# Patient Record
Sex: FEMALE | Race: WHITE | NOT HISPANIC OR LATINO | Employment: OTHER | URBAN - METROPOLITAN AREA
[De-identification: names, ages, dates, MRNs, and addresses within clinical notes are randomized per-mention and may not be internally consistent; named-entity substitution may affect disease eponyms.]

---

## 2017-07-19 ENCOUNTER — OFFICE VISIT (OUTPATIENT)
Dept: URGENT CARE | Facility: CLINIC | Age: 82
End: 2017-07-19
Payer: MEDICARE

## 2017-07-19 VITALS
BODY MASS INDEX: 19.83 KG/M2 | OXYGEN SATURATION: 96 % | TEMPERATURE: 98 F | RESPIRATION RATE: 17 BRPM | SYSTOLIC BLOOD PRESSURE: 153 MMHG | HEIGHT: 61 IN | DIASTOLIC BLOOD PRESSURE: 75 MMHG | WEIGHT: 105 LBS | HEART RATE: 95 BPM

## 2017-07-19 DIAGNOSIS — R05.9 COUGH IN ADULT PATIENT: Primary | ICD-10-CM

## 2017-07-19 PROBLEM — G25.81 RESTLESS LEG SYNDROME: Status: ACTIVE | Noted: 2017-07-19

## 2017-07-19 PROBLEM — J18.9 PNEUMONIA OF LEFT UPPER LOBE DUE TO INFECTIOUS ORGANISM: Status: ACTIVE | Noted: 2017-07-19

## 2017-07-19 PROBLEM — J18.9 LEFT LOWER LOBE PNEUMONIA: Status: ACTIVE | Noted: 2017-07-19

## 2017-07-19 PROCEDURE — 1159F MED LIST DOCD IN RCRD: CPT | Mod: S$GLB,,, | Performed by: EMERGENCY MEDICINE

## 2017-07-19 PROCEDURE — 99213 OFFICE O/P EST LOW 20 MIN: CPT | Mod: S$GLB,,, | Performed by: EMERGENCY MEDICINE

## 2017-07-19 RX ORDER — GABAPENTIN 300 MG/1
300 CAPSULE ORAL
COMMUNITY

## 2017-07-19 RX ORDER — TRIPROLIDINE/PSEUDOEPHEDRINE 2.5MG-60MG
TABLET ORAL EVERY 6 HOURS PRN
COMMUNITY
End: 2024-01-24

## 2017-07-19 NOTE — PROGRESS NOTES
Subjective:       Patient ID: Mendy Perdomo is a 86 y.o. female.    Chief Complaint: Cough (2 weeks)    Cough   This is a new problem. The current episode started 1 to 4 weeks ago. The problem has been gradually worsening. The problem occurs constantly. The cough is productive of sputum. Associated symptoms include chills, ear congestion, ear pain, a fever, nasal congestion and postnasal drip. Pertinent negatives include no chest pain, eye redness, headaches, myalgias, sore throat, shortness of breath or wheezing. The symptoms are aggravated by lying down. She has tried oral steroids for the symptoms. The treatment provided no relief. Her past medical history is significant for pneumonia.     Review of Systems   Constitution: Positive for chills and fever. Negative for malaise/fatigue.   HENT: Positive for congestion, ear pain and postnasal drip. Negative for headaches, hoarse voice and sore throat.    Eyes: Negative for discharge and redness.   Cardiovascular: Negative for chest pain, dyspnea on exertion and leg swelling.   Respiratory: Positive for cough. Negative for shortness of breath, sputum production and wheezing.    Musculoskeletal: Negative for myalgias.   Gastrointestinal: Negative for abdominal pain and nausea.       Objective:      Physical Exam   Constitutional: She is oriented to person, place, and time. She appears well-developed and well-nourished. She is cooperative.  Non-toxic appearance. She does not appear ill. No distress.   HENT:   Head: Normocephalic and atraumatic.   Right Ear: Hearing, tympanic membrane, external ear and ear canal normal.   Left Ear: Hearing, tympanic membrane, external ear and ear canal normal.   Nose: Nose normal. No mucosal edema, rhinorrhea or nasal deformity. No epistaxis. Right sinus exhibits no maxillary sinus tenderness and no frontal sinus tenderness. Left sinus exhibits no maxillary sinus tenderness and no frontal sinus tenderness.   Mouth/Throat: Uvula is  midline, oropharynx is clear and moist and mucous membranes are normal. No trismus in the jaw. Normal dentition. No uvula swelling. No posterior oropharyngeal erythema.   Eyes: Conjunctivae and lids are normal. No scleral icterus.   Sclera clear bilat   Neck: Trachea normal, full passive range of motion without pain and phonation normal. Neck supple.   Cardiovascular: Normal rate, regular rhythm, normal heart sounds, intact distal pulses and normal pulses.    Pulmonary/Chest: Effort normal and breath sounds normal. No respiratory distress.   Persistent cough. Describes greenish sputum. Mild coarse breath sounds on the left. No musical wheezes.   Abdominal: Soft. Normal appearance. She exhibits no distension. There is no tenderness.   Musculoskeletal: Normal range of motion. She exhibits no edema or deformity.   Neurological: She is alert and oriented to person, place, and time. She exhibits normal muscle tone. Coordination normal.   Skin: Skin is warm and intact. She is diaphoretic (Mild clammy feeling). No pallor.   Patient feels mildly clammy.   Psychiatric: She has a normal mood and affect. Her speech is normal and behavior is normal. Judgment and thought content normal. Cognition and memory are normal.   Nursing note and vitals reviewed.      Assessment:       1. Cough in adult patient        Plan:         Cough in adult patient  -     X-Ray Chest PA And Lateral; Future; Expected date: 07/19/2017      Chest X-ray reviewed. Old scarring on the left. Unable to rule out new infiltrate. Referring to the ER.

## 2017-07-20 PROBLEM — G47.00 INSOMNIA: Status: ACTIVE | Noted: 2017-07-20

## 2017-12-06 ENCOUNTER — OFFICE VISIT (OUTPATIENT)
Dept: URGENT CARE | Facility: CLINIC | Age: 82
End: 2017-12-06
Payer: MEDICARE

## 2017-12-06 VITALS
OXYGEN SATURATION: 96 % | HEART RATE: 57 BPM | DIASTOLIC BLOOD PRESSURE: 72 MMHG | TEMPERATURE: 96 F | SYSTOLIC BLOOD PRESSURE: 159 MMHG | RESPIRATION RATE: 16 BRPM

## 2017-12-06 DIAGNOSIS — L03.011 PARONYCHIA OF FINGER OF RIGHT HAND: Primary | ICD-10-CM

## 2017-12-06 PROCEDURE — 99213 OFFICE O/P EST LOW 20 MIN: CPT | Mod: S$GLB,,, | Performed by: FAMILY MEDICINE

## 2017-12-06 RX ORDER — SULFAMETHOXAZOLE AND TRIMETHOPRIM 800; 160 MG/1; MG/1
1 TABLET ORAL 2 TIMES DAILY
Qty: 14 TABLET | Refills: 0 | Status: SHIPPED | OUTPATIENT
Start: 2017-12-06 | End: 2017-12-13

## 2017-12-06 RX ORDER — FLUCONAZOLE 150 MG/1
150 TABLET ORAL DAILY
Qty: 1 TABLET | Refills: 0 | Status: SHIPPED | OUTPATIENT
Start: 2017-12-06 | End: 2017-12-07

## 2017-12-06 NOTE — PATIENT INSTRUCTIONS
Please return here or go to the Emergency Department for any concerns or worsening of condition.  If you were prescribed antibiotics, please take them to completion.  If you were prescribed a narcotic medication, do not drive or operate heavy equipment or machinery while taking these medications.  Please follow up with your primary care doctor or specialist as needed.  If you  smoke, please stop smoking.  Abscess/cellulitis    If you have been diagnosed with an abscess, this is an infection which causes a collection of pus under the skin. Symptoms include swelling, redness and pain.   If your abscess does not need to be opened and drained, in the next few days, your absess MAY improve with the antibiotics and warm compresses, OR it may collect in a manner which will necessitate opening and draining it.   If it was drained, you should return in 2-3 days to have the packing removed.     If you were diagnosed with cellulitis, this is an infection in the layers of the skin. It is usually the result of an infected bite or injury to the top surface of the skin or from poor venous circulation.     You should take your antibiotics as directed - until gone.  You should follow-up with your Primary Care Doctor in 2-3 days for a re-check.  Watch for worseniong symptoms such as increased swelling, worsening of infection, increased pain, red streaks, and fever.   Go to the ER if you suddenly worsen with severe symptoms    Please return here or go to the Emergency Department for any concerns or worsening of condition.  If you were prescribed antibiotics, please take them to completion.  If you were prescribed a narcotic medication, do not drive or operate heavy equipment or machinery while taking these medications.  Please follow up with your primary care doctor or specialist as needed.  If you  smoke, please stop smoking.

## 2017-12-06 NOTE — PROGRESS NOTES
Subjective:       Patient ID: Mendy Perdomo is a 86 y.o. female.    Vitals:  tympanic temperature is 95.6 °F (35.3 °C) (abnormal). Her blood pressure is 159/72 (abnormal) and her pulse is 57 (abnormal). Her respiration is 16 and oxygen saturation is 96%.     Chief Complaint: Nail Problem    PT C/O POSSIBLE PARONYCHIA ON RIGHT RING FINGER, STARTED ABOUT 6 WEEKS , REDNESS NOTED, TENDER TO TOUCH, POURING PEROXIDE WITH NO RELIEF,       Nail Problem   This is a new problem. The current episode started more than 1 month ago. The problem occurs constantly. The problem has been unchanged. Pertinent negatives include no abdominal pain, chest pain, chills, fever, headaches, nausea, rash, sore throat or vomiting. Treatments tried: PEROXIDE  The treatment provided no relief.     Review of Systems   Constitution: Negative for chills and fever.   HENT: Negative for sore throat.    Eyes: Negative for blurred vision.   Cardiovascular: Negative for chest pain.   Respiratory: Negative for shortness of breath.    Skin: Negative for rash.   Musculoskeletal: Negative for back pain and joint pain.   Gastrointestinal: Negative for abdominal pain, diarrhea, nausea and vomiting.   Neurological: Negative for headaches.   Psychiatric/Behavioral: The patient is not nervous/anxious.        Objective:      Physical Exam   Constitutional: She appears well-developed and well-nourished.   HENT:   Head: Normocephalic and atraumatic.   Right Ear: External ear normal.   Left Ear: External ear normal.   Nose: Nose normal.   Mouth/Throat: Oropharynx is clear and moist. No oropharyngeal exudate.   Eyes: Conjunctivae are normal. Right eye exhibits no discharge. Left eye exhibits no discharge.   Cardiovascular: Normal rate, regular rhythm and normal heart sounds.    Pulmonary/Chest: Effort normal and breath sounds normal. She has no wheezes.   Skin: Skin is warm and dry.   Right 3rd finger with erythema to nail border, no drainage   Psychiatric: She  has a normal mood and affect. Her behavior is normal.   Vitals reviewed.      Assessment:       1. Paronychia of finger of right hand        Plan:         Paronychia of finger of right hand  -     fluconazole (DIFLUCAN) 150 MG Tab; Take 1 tablet (150 mg total) by mouth once daily.  Dispense: 1 tablet; Refill: 0    Other orders  -     sulfamethoxazole-trimethoprim 800-160mg (BACTRIM DS) 800-160 mg Tab; Take 1 tablet by mouth 2 (two) times daily.  Dispense: 14 tablet; Refill: 0

## 2017-12-15 ENCOUNTER — OFFICE VISIT (OUTPATIENT)
Dept: URGENT CARE | Facility: CLINIC | Age: 82
End: 2017-12-15
Payer: MEDICARE

## 2017-12-15 VITALS
DIASTOLIC BLOOD PRESSURE: 62 MMHG | OXYGEN SATURATION: 98 % | SYSTOLIC BLOOD PRESSURE: 128 MMHG | BODY MASS INDEX: 21.2 KG/M2 | TEMPERATURE: 97 F | WEIGHT: 108 LBS | HEIGHT: 60 IN | HEART RATE: 59 BPM

## 2017-12-15 DIAGNOSIS — L03.011 PARONYCHIA OF RIGHT RING FINGER: Primary | ICD-10-CM

## 2017-12-15 PROCEDURE — 99213 OFFICE O/P EST LOW 20 MIN: CPT | Mod: S$GLB,,, | Performed by: EMERGENCY MEDICINE

## 2017-12-15 RX ORDER — KETOCONAZOLE 20 MG/G
CREAM TOPICAL
Qty: 15 G | Refills: 1 | Status: SHIPPED | OUTPATIENT
Start: 2017-12-15 | End: 2018-12-15

## 2017-12-15 NOTE — PROGRESS NOTES
Subjective:       Patient ID: Mendy Perdomo is a 86 y.o. female.    Vitals:  height is 5' (1.524 m) and weight is 49 kg (108 lb). Her oral temperature is 97 °F (36.1 °C). Her blood pressure is 128/62 and her pulse is 59 (abnormal). Her oxygen saturation is 98%.     Chief Complaint: Hand Pain (Rt. hand 4th digit)    Finished last antibiotic this past Wed.      Hand Pain    The incident occurred more than 1 week ago. There was no injury mechanism. The pain radiates to the right hand. The pain is mild. The pain has been fluctuating since the incident.     Review of Systems   Skin: Positive for color change, nail changes and poor wound healing. Negative for rash.       Objective:      Physical Exam   Constitutional: She is oriented to person, place, and time. She appears well-developed and well-nourished. She is cooperative.  Non-toxic appearance. She does not appear ill. No distress.   HENT:   Head: Normocephalic and atraumatic.   Right Ear: Hearing, tympanic membrane, external ear and ear canal normal.   Left Ear: Hearing, tympanic membrane, external ear and ear canal normal.   Nose: Nose normal. No mucosal edema, rhinorrhea or nasal deformity. No epistaxis. Right sinus exhibits no maxillary sinus tenderness and no frontal sinus tenderness. Left sinus exhibits no maxillary sinus tenderness and no frontal sinus tenderness.   Mouth/Throat: Uvula is midline, oropharynx is clear and moist and mucous membranes are normal. No trismus in the jaw. Normal dentition. No uvula swelling. No posterior oropharyngeal erythema.   Eyes: Conjunctivae and lids are normal. Right eye exhibits no discharge. Left eye exhibits no discharge. No scleral icterus.   Sclera clear bilat   Neck: Trachea normal, normal range of motion, full passive range of motion without pain and phonation normal. Neck supple.   Cardiovascular: Normal rate, regular rhythm, normal heart sounds, intact distal pulses and normal pulses.    Pulmonary/Chest: Effort  normal and breath sounds normal. No respiratory distress.   Abdominal: Soft. Normal appearance. She exhibits no distension, no pulsatile midline mass and no mass. There is no tenderness.   Musculoskeletal: Normal range of motion. She exhibits no edema or deformity.        Arms:  Neurological: She is alert and oriented to person, place, and time. She exhibits normal muscle tone. Coordination normal.   Skin: Skin is warm, dry and intact. She is not diaphoretic. No pallor.   Psychiatric: She has a normal mood and affect. Her speech is normal and behavior is normal. Judgment and thought content normal. Cognition and memory are normal.   Nursing note and vitals reviewed.      Assessment:       1. Paronychia of right ring finger        Plan:         Paronychia of right ring finger  -     ketoconazole (NIZORAL) 2 % cream; Apply to affected area twice daily.  Dispense: 15 g; Refill: 1

## 2017-12-15 NOTE — PATIENT INSTRUCTIONS
Paronychia of the Finger or Toe  Paronychia is an infection near a fingernail or toenail. It usually occurs when an opening in the cuticle or an ingrown toenail lets bacteria under the skin.  The infection will need to be drained if pus is present. If the infection has been caught early, you may need only antibiotic treatment. Healing will take about 1 to 2 weeks.  Home care  Follow these guidelines when caring for yourself at home:  · Clean and soak the toe or finger. Do this 2 times a day for the first 3 days. To do so:  ¨ Soak your foot or hand in a tub of warm water for 5 minutes. Or hold your toe or finger under a faucet of warm running water for 5 minutes.  ¨ Clean any crust away with soap and water using a cotton swab.  ¨ Put antibiotic ointment on the infected area.  · Change the dressing daily or any time it gets dirty.  · If you were given antibiotics, take them as directed until they are all gone.  · If your infection is on a toe, wear comfortable shoes with a lot of toe room. You can also wear open-toed sandals while your toe heals.  · You may use over-the-counter medicine (acetaminophen or ibuprofen to help with pain, unless another medicine was prescribed. If you have chronic liver or kidney disease, talk with your healthcare provider before using these medicines. Also talk with your provider if you've had a stomach ulcer or GI (gastrointestinal) bleeding.  Prevention  The following can prevent paronychia:  · Avoid cutting or playing with your cuticles at home.  · Don't bite your nails.  · Don't suck on your thumbs or fingers.  Follow-up care  Follow up with your healthcare provider, or as advised.  When to seek medical advice  Call your healthcare provider right away if any of these occur:  · Redness, pain, or swelling of the finger or toe gets worse  · Red streaks in the skin leading away from the wound  · Pus or fluid draining from the nail area  · Fever of 100.4ºF (38ºC) or higher, or as directed  by your provider  Date Last Reviewed: 8/1/2016  © 0068-8701 [employer]. [address] [place] [address], [place] [address]. All rights reserved. This information is not intended as a substitute for professional medical care. Always follow your healthcare professional's instructions.

## 2017-12-19 ENCOUNTER — TELEPHONE (OUTPATIENT)
Dept: URGENT CARE | Facility: CLINIC | Age: 82
End: 2017-12-19

## 2018-12-05 ENCOUNTER — OFFICE VISIT (OUTPATIENT)
Dept: URGENT CARE | Facility: CLINIC | Age: 83
End: 2018-12-05
Payer: MEDICARE

## 2018-12-05 VITALS
WEIGHT: 108 LBS | BODY MASS INDEX: 21.2 KG/M2 | HEART RATE: 65 BPM | OXYGEN SATURATION: 98 % | HEIGHT: 60 IN | SYSTOLIC BLOOD PRESSURE: 158 MMHG | DIASTOLIC BLOOD PRESSURE: 55 MMHG | TEMPERATURE: 97 F

## 2018-12-05 DIAGNOSIS — J06.9 VIRAL URI WITH COUGH: ICD-10-CM

## 2018-12-05 DIAGNOSIS — R05.9 COUGH: Primary | ICD-10-CM

## 2018-12-05 PROCEDURE — 99214 OFFICE O/P EST MOD 30 MIN: CPT | Mod: S$GLB,,, | Performed by: PHYSICIAN ASSISTANT

## 2018-12-05 PROCEDURE — 71046 X-RAY EXAM CHEST 2 VIEWS: CPT | Mod: S$GLB,,, | Performed by: RADIOLOGY

## 2018-12-05 RX ORDER — FLUTICASONE PROPIONATE 50 MCG
2 SPRAY, SUSPENSION (ML) NASAL DAILY
Qty: 1 BOTTLE | Refills: 0 | Status: SHIPPED | OUTPATIENT
Start: 2018-12-05

## 2018-12-05 RX ORDER — BENZONATATE 100 MG/1
100 CAPSULE ORAL EVERY 6 HOURS PRN
Qty: 60 CAPSULE | Refills: 1 | Status: SHIPPED | OUTPATIENT
Start: 2018-12-05 | End: 2019-12-05

## 2018-12-05 NOTE — PROGRESS NOTES
Subjective:       Patient ID: Mendy Perdomo is a 87 y.o. female.    Vitals:  height is 5' (1.524 m) and weight is 49 kg (108 lb). Her tympanic temperature is 97.1 °F (36.2 °C). Her blood pressure is 158/55 (abnormal) and her pulse is 65. Her oxygen saturation is 98%.     Chief Complaint: URI    x1 week, last Advil dose today @12:00      URI    This is a new problem. The current episode started 1 to 4 weeks ago. The problem has been gradually worsening. There has been no fever. Associated symptoms include congestion, coughing and sneezing. Pertinent negatives include no ear pain, nausea, rash, sinus pain, sore throat, vomiting or wheezing. She has tried acetaminophen for the symptoms. The treatment provided mild relief.       Constitution: Positive for fatigue. Negative for chills, sweating and fever.   HENT: Positive for congestion and sinus pressure. Negative for ear pain, sinus pain, sore throat and voice change.    Neck: Negative for painful lymph nodes.   Eyes: Negative for eye redness.   Respiratory: Positive for cough. Negative for chest tightness, sputum production, bloody sputum, COPD, shortness of breath, stridor, wheezing and asthma.    Gastrointestinal: Negative for nausea and vomiting.   Musculoskeletal: Negative for muscle ache.   Skin: Negative for rash.   Allergic/Immunologic: Positive for sneezing. Negative for seasonal allergies and asthma.   Hematologic/Lymphatic: Negative for swollen lymph nodes.       Objective:      Physical Exam   Constitutional: She is oriented to person, place, and time. She appears well-developed and well-nourished. She is cooperative.  Non-toxic appearance. She does not appear ill. No distress.   HENT:   Head: Normocephalic and atraumatic.   Right Ear: Hearing, tympanic membrane, external ear and ear canal normal.   Left Ear: Hearing, tympanic membrane, external ear and ear canal normal.   Nose: Nose normal. No mucosal edema, rhinorrhea or nasal deformity. No  epistaxis. Right sinus exhibits no maxillary sinus tenderness and no frontal sinus tenderness. Left sinus exhibits no maxillary sinus tenderness and no frontal sinus tenderness.   Mouth/Throat: Uvula is midline, oropharynx is clear and moist and mucous membranes are normal. No trismus in the jaw. Normal dentition. No uvula swelling. No posterior oropharyngeal erythema.   Eyes: Conjunctivae and lids are normal. No scleral icterus.   Sclera clear bilat   Neck: Trachea normal, full passive range of motion without pain and phonation normal. Neck supple.   Cardiovascular: Normal rate, regular rhythm, normal heart sounds, intact distal pulses and normal pulses.   Pulmonary/Chest: Effort normal and breath sounds normal. No respiratory distress.   Abdominal: Soft. Normal appearance and bowel sounds are normal. She exhibits no distension. There is no tenderness.   Musculoskeletal: Normal range of motion. She exhibits no edema or deformity.   Neurological: She is alert and oriented to person, place, and time. She exhibits normal muscle tone. Coordination normal.   Skin: Skin is warm, dry and intact. She is not diaphoretic. No pallor.   Psychiatric: She has a normal mood and affect. Her speech is normal and behavior is normal. Judgment and thought content normal. Cognition and memory are normal.   Nursing note and vitals reviewed.      Assessment:       1. Cough    2. Viral URI with cough        Plan:         Cough  -     X-Ray Chest PA And Lateral; Future; Expected date: 12/05/2018  I reviewed the images: no infiltrate    Viral URI with cough    Other orders  -     benzonatate (TESSALON PERLES) 100 MG capsule; Take 1 capsule (100 mg total) by mouth every 6 (six) hours as needed.  Dispense: 60 capsule; Refill: 1  -     fluticasone (FLONASE ALLERGY RELIEF) 50 mcg/actuation nasal spray; 2 sprays (100 mcg total) by Each Nare route once daily.  Dispense: 1 Bottle; Refill: 0     I discussed with the patient the importance of follow  up if their symptoms have not resolved in 1-2 week's time. Patient verbalized understanding and will RTC or go to the nearest ER if symptoms persist or worsen.

## 2019-08-28 PROBLEM — M81.0 SENILE OSTEOPOROSIS: Status: ACTIVE | Noted: 2019-08-28

## 2021-06-10 ENCOUNTER — OFFICE VISIT (OUTPATIENT)
Dept: URGENT CARE | Facility: CLINIC | Age: 86
End: 2021-06-10
Payer: MEDICARE

## 2021-06-10 VITALS
DIASTOLIC BLOOD PRESSURE: 58 MMHG | BODY MASS INDEX: 21.6 KG/M2 | RESPIRATION RATE: 17 BRPM | WEIGHT: 110 LBS | HEIGHT: 60 IN | HEART RATE: 87 BPM | TEMPERATURE: 99 F | SYSTOLIC BLOOD PRESSURE: 135 MMHG | OXYGEN SATURATION: 97 %

## 2021-06-10 DIAGNOSIS — R09.82 POST-NASAL DRIP: ICD-10-CM

## 2021-06-10 DIAGNOSIS — J02.9 ACUTE PHARYNGITIS, UNSPECIFIED ETIOLOGY: Primary | ICD-10-CM

## 2021-06-10 PROCEDURE — 99214 PR OFFICE/OUTPT VISIT, EST, LEVL IV, 30-39 MIN: ICD-10-PCS | Mod: S$GLB,,, | Performed by: PHYSICIAN ASSISTANT

## 2021-06-10 PROCEDURE — 99214 OFFICE O/P EST MOD 30 MIN: CPT | Mod: S$GLB,,, | Performed by: PHYSICIAN ASSISTANT

## 2021-06-10 RX ORDER — AZELASTINE 1 MG/ML
1 SPRAY, METERED NASAL 2 TIMES DAILY
Qty: 30 ML | Refills: 0 | Status: SHIPPED | OUTPATIENT
Start: 2021-06-10 | End: 2022-06-10

## 2021-06-11 ENCOUNTER — CLINICAL SUPPORT (OUTPATIENT)
Dept: URGENT CARE | Facility: CLINIC | Age: 86
End: 2021-06-11

## 2021-06-11 DIAGNOSIS — J02.9 SORE THROAT: Primary | ICD-10-CM

## 2021-06-11 LAB
CTP QC/QA: YES
SARS-COV-2 RDRP RESP QL NAA+PROBE: NEGATIVE

## 2021-06-11 PROCEDURE — U0002: ICD-10-PCS | Mod: QW,S$GLB,, | Performed by: PHYSICIAN ASSISTANT

## 2021-06-11 PROCEDURE — U0002 COVID-19 LAB TEST NON-CDC: HCPCS | Mod: QW,S$GLB,, | Performed by: PHYSICIAN ASSISTANT

## 2022-03-11 ENCOUNTER — OFFICE VISIT (OUTPATIENT)
Dept: URGENT CARE | Facility: CLINIC | Age: 87
End: 2022-03-11
Payer: MEDICARE

## 2022-03-11 VITALS
WEIGHT: 105 LBS | HEIGHT: 60 IN | RESPIRATION RATE: 16 BRPM | OXYGEN SATURATION: 98 % | SYSTOLIC BLOOD PRESSURE: 147 MMHG | HEART RATE: 70 BPM | DIASTOLIC BLOOD PRESSURE: 70 MMHG | TEMPERATURE: 98 F | BODY MASS INDEX: 20.62 KG/M2

## 2022-03-11 DIAGNOSIS — J01.90 ACUTE NON-RECURRENT SINUSITIS, UNSPECIFIED LOCATION: Primary | ICD-10-CM

## 2022-03-11 PROCEDURE — 1159F PR MEDICATION LIST DOCUMENTED IN MEDICAL RECORD: ICD-10-PCS | Mod: CPTII,S$GLB,, | Performed by: EMERGENCY MEDICINE

## 2022-03-11 PROCEDURE — 99213 OFFICE O/P EST LOW 20 MIN: CPT | Mod: S$GLB,,, | Performed by: EMERGENCY MEDICINE

## 2022-03-11 PROCEDURE — 99213 PR OFFICE/OUTPT VISIT, EST, LEVL III, 20-29 MIN: ICD-10-PCS | Mod: S$GLB,,, | Performed by: EMERGENCY MEDICINE

## 2022-03-11 PROCEDURE — 1160F RVW MEDS BY RX/DR IN RCRD: CPT | Mod: CPTII,S$GLB,, | Performed by: EMERGENCY MEDICINE

## 2022-03-11 PROCEDURE — 1159F MED LIST DOCD IN RCRD: CPT | Mod: CPTII,S$GLB,, | Performed by: EMERGENCY MEDICINE

## 2022-03-11 PROCEDURE — 1160F PR REVIEW ALL MEDS BY PRESCRIBER/CLIN PHARMACIST DOCUMENTED: ICD-10-PCS | Mod: CPTII,S$GLB,, | Performed by: EMERGENCY MEDICINE

## 2022-03-11 RX ORDER — DOXYCYCLINE HYCLATE 100 MG
100 TABLET ORAL EVERY 12 HOURS
Qty: 10 TABLET | Refills: 0 | Status: SHIPPED | OUTPATIENT
Start: 2022-03-11 | End: 2022-03-16

## 2022-03-11 NOTE — PATIENT INSTRUCTIONS
I recommend using Flonase( fluticasone) nasal spray.  This may be purchased over-the-counter.  Seek medical care if symptoms worsen.

## 2022-03-11 NOTE — PROGRESS NOTES
Subjective:       Patient ID: Mendy Perdomo is a 89 y.o. female.    Vitals:  height is 5' (1.524 m) and weight is 47.6 kg (105 lb). Her temperature is 97.6 °F (36.4 °C). Her blood pressure is 147/70 (abnormal) and her pulse is 70. Her respiration is 16 and oxygen saturation is 98%.     Chief Complaint: Nasal Congestion    Patient presents to clinic with cold symptoms. Patient states symptoms started 2 weeks ago and are gradually worsening.  Symptoms: nasal congestion, sinus pressure, postnasal drip, left ear pain, fatigue. Treatment tried: Sudafed, Mucinex, Allegra-D; all with mild relief.      URI   This is a new problem. The current episode started 1 to 4 weeks ago. The problem has been gradually worsening. There has been no fever. Associated symptoms include congestion and ear pain. Treatments tried: Sudafed, Mucinex, Allegra-D. The treatment provided mild relief.       Constitution: Positive for appetite change and fatigue.   HENT: Positive for ear pain, congestion, postnasal drip and sinus pressure.        Objective:      Physical Exam   Constitutional: She is oriented to person, place, and time. She appears well-developed. She is cooperative.  Non-toxic appearance. She does not appear ill. No distress.   HENT:   Head: Normocephalic and atraumatic.   Ears:   Right Ear: Hearing, tympanic membrane, external ear and ear canal normal.   Left Ear: Hearing, tympanic membrane, external ear and ear canal normal.   Nose: Nose normal. No mucosal edema, rhinorrhea or nasal deformity. No epistaxis. Right sinus exhibits no maxillary sinus tenderness and no frontal sinus tenderness. Left sinus exhibits no maxillary sinus tenderness and no frontal sinus tenderness.   Mouth/Throat: Uvula is midline, oropharynx is clear and moist and mucous membranes are normal. Mucous membranes are moist. No trismus in the jaw. Normal dentition. No uvula swelling. No oropharyngeal exudate, posterior oropharyngeal edema or posterior  oropharyngeal erythema. Oropharynx is clear.   Eyes: Conjunctivae and lids are normal. No scleral icterus.   Neck: Trachea normal and phonation normal. Neck supple. No edema present. No erythema present. No neck rigidity present.   Cardiovascular: Normal rate, regular rhythm, normal heart sounds and normal pulses.   Pulmonary/Chest: Effort normal and breath sounds normal. No respiratory distress. She has no decreased breath sounds. She has no wheezes. She has no rhonchi. She has no rales.   Abdominal: Normal appearance.   Musculoskeletal: Normal range of motion.         General: No deformity. Normal range of motion.   Neurological: no focal deficit. She is alert and oriented to person, place, and time. She exhibits normal muscle tone. Coordination normal.   Skin: Skin is warm, dry, intact, not diaphoretic and not pale. Capillary refill takes less than 2 seconds.   Psychiatric: Her speech is normal and behavior is normal. Mood, judgment and thought content normal.   Nursing note and vitals reviewed.        Assessment:       1. Acute non-recurrent sinusitis, unspecified location          Plan:         Acute non-recurrent sinusitis, unspecified location  -     doxycycline (VIBRA-TABS) 100 MG tablet; Take 1 tablet (100 mg total) by mouth every 12 (twelve) hours. for 5 days  Dispense: 10 tablet; Refill: 0

## 2024-08-27 PROBLEM — S72.115A NONDISPLACED FRACTURE OF GREATER TROCHANTER OF LEFT FEMUR, INITIAL ENCOUNTER FOR CLOSED FRACTURE: Status: ACTIVE | Noted: 2024-08-27

## 2025-01-17 PROBLEM — M25.649 STIFFNESS OF FINGER JOINT: Status: ACTIVE | Noted: 2025-01-17

## 2025-02-04 ENCOUNTER — OFFICE VISIT (OUTPATIENT)
Dept: PAIN MEDICINE | Facility: CLINIC | Age: OVER 89
End: 2025-02-04
Payer: MEDICARE

## 2025-02-04 VITALS
HEART RATE: 60 BPM | SYSTOLIC BLOOD PRESSURE: 138 MMHG | HEIGHT: 60 IN | DIASTOLIC BLOOD PRESSURE: 66 MMHG | BODY MASS INDEX: 20.8 KG/M2 | WEIGHT: 105.94 LBS

## 2025-02-04 DIAGNOSIS — M48.062 SPINAL STENOSIS OF LUMBAR REGION WITH NEUROGENIC CLAUDICATION: Primary | ICD-10-CM

## 2025-02-04 DIAGNOSIS — G89.29 CHRONIC MIDLINE LOW BACK PAIN WITH RIGHT-SIDED SCIATICA: ICD-10-CM

## 2025-02-04 DIAGNOSIS — M54.41 CHRONIC MIDLINE LOW BACK PAIN WITH RIGHT-SIDED SCIATICA: ICD-10-CM

## 2025-02-04 DIAGNOSIS — M96.1 FAILED BACK SURGICAL SYNDROME: ICD-10-CM

## 2025-02-04 DIAGNOSIS — M54.16 LUMBAR RADICULOPATHY: ICD-10-CM

## 2025-02-04 PROCEDURE — 1100F PTFALLS ASSESS-DOCD GE2>/YR: CPT | Mod: CPTII,S$GLB,, | Performed by: ANESTHESIOLOGY

## 2025-02-04 PROCEDURE — 1160F RVW MEDS BY RX/DR IN RCRD: CPT | Mod: CPTII,S$GLB,, | Performed by: ANESTHESIOLOGY

## 2025-02-04 PROCEDURE — 1159F MED LIST DOCD IN RCRD: CPT | Mod: CPTII,S$GLB,, | Performed by: ANESTHESIOLOGY

## 2025-02-04 PROCEDURE — 3288F FALL RISK ASSESSMENT DOCD: CPT | Mod: CPTII,S$GLB,, | Performed by: ANESTHESIOLOGY

## 2025-02-04 PROCEDURE — 1125F AMNT PAIN NOTED PAIN PRSNT: CPT | Mod: CPTII,S$GLB,, | Performed by: ANESTHESIOLOGY

## 2025-02-04 PROCEDURE — 99999 PR PBB SHADOW E&M-EST. PATIENT-LVL III: CPT | Mod: PBBFAC,,, | Performed by: ANESTHESIOLOGY

## 2025-02-04 PROCEDURE — 99205 OFFICE O/P NEW HI 60 MIN: CPT | Mod: S$GLB,,, | Performed by: ANESTHESIOLOGY

## 2025-02-04 NOTE — PROGRESS NOTES
[facility] New Patient Evaluation      Referred by: Dr. Adriane Juarez    PCP:     CC:   Chief Complaint   Patient presents with    Low-back Pain    Leg Pain     Both leg aciatica          2/4/2025    10:33 AM   Last 3 PDI Scores   Pain Disability Index (PDI) 12         HPI:   Mendy Perdomo is a 89 y.o. female patient who has a past medical history of Diverticulitis, Greater trochanter fracture, Hepatitis, Left elbow fracture, Restless leg syndrome, Stroke, and Wrist fracture, bilateral. She presents with back pain.  She is here for a 2nd opinion.  She reports chronic lower back pain for over the past year.  She has a history of previous lumbar compression fracture in July of 2024.  Today she reports her pain is usually 5-6/10, intermittent, aching in her lower back radiating down both of her hips and both of her thighs.  Pain is worse with standing and walking and relieved with heat, medications, PT.      Pain Intervention History:      Past Spine Surgical History:  - h/o prior laminectomy at L3-4 on the left    Past and current medications:  Antineuropathics: gabapentin   NSAIDs: celebrex   Physical therapy: yes, completed   Antidepressants:  Muscle relaxers: tizandine   Opioids: oxycodone   Antiplatelets/Anticoagulants:    History:    Current Outpatient Medications:     ALPRAZolam (XANAX) 0.25 MG tablet, Use 1-2 tablets 30-60 mins prior to infusion or needle use for needle phobia; may use every 4-6 hrs as needed, Disp: 20 tablet, Rfl: 0    celecoxib (CELEBREX) 100 MG capsule, Take 1-2 capsules (100-200 mg total) by mouth 2 (two) times daily as needed for Pain., Disp: 180 capsule, Rfl: 0    docusate sodium (COLACE) 100 MG capsule, Take 1 capsule (100 mg total) by mouth 2 (two) times daily., Disp: 90 capsule, Rfl: 0    fexofenadine (ALLEGRA) 180 MG tablet, Take 180 mg by mouth once daily., Disp: , Rfl:     gabapentin (NEURONTIN) 600 MG tablet, Take 1 tablet (600 mg total) by mouth 3 (three) times  daily as needed (pain/hot flushes)., Disp: 90 tablet, Rfl: 0    oxyCODONE-acetaminophen (PERCOCET)  mg per tablet, Take 1 tablet by mouth., Disp: , Rfl:     tiZANidine (ZANAFLEX) 4 MG tablet, Take 1 tablet (4 mg total) by mouth every 6 (six) hours as needed (pain (muscle/joint/bone))., Disp: 90 tablet, Rfl: 3    Past Medical History:   Diagnosis Date    Diverticulitis     Greater trochanter fracture 08/16/2024    left    Hepatitis 1955    Left elbow fracture 08/2024    Restless leg syndrome     Stroke 2021    Wrist fracture, bilateral 06/2024       Past Surgical History:   Procedure Laterality Date    ABDOMINAL AORTIC ANEURYSM REPAIR  2006    APPENDECTOMY  1945    COLON RESECTION  2005    HYSTERECTOMY  1969    LAMINECTOMY Bilateral 06/06/2024    L3-L4    SPLENECTOMY, TOTAL  2006       Family History   Problem Relation Name Age of Onset    Diabetes Mother      Coronary artery disease Father         Social History     Socioeconomic History    Marital status:    Tobacco Use    Smoking status: Never    Smokeless tobacco: Never   Substance and Sexual Activity    Alcohol use: Not Currently     Alcohol/week: 2.0 standard drinks of alcohol     Types: 2 Glasses of wine per week     Comment: occassional    Drug use: Never       Review of patient's allergies indicates:   Allergen Reactions    Hydrocodone Other (See Comments)     hallucinations    Relafen [nabumetone] Hives       Review of Systems:  12 point review of systems is negative.    Physical Exam:  Vitals:    02/04/25 1035   BP: 138/66   Pulse: 60   Weight: 48.1 kg (105 lb 14.9 oz)   Height: 5' (1.524 m)   PainSc:   2   PainLoc: Back     Body mass index is 20.69 kg/m².    Gen: NAD  Psych: mood appropriate for given condition  HEENT: eyes anicteric   CV: RRR  HEENT: anicteric   Respiratory: non-labored, no signs of respiratory distress  Abd: non-distended  Skin: warm, dry and intact.  Gait: No antalgic gait.     Pain with lumbar axial facet  "loading    Sensory:  Intact and symmetrical to light touch in L1-S1 dermatomes bilaterally.    Motor:     Right Left   L2/3 Iliacus Hip flexion  5  5   L3/4 Qudratus Femoris Knee Extension  5  5   L4/5 Tib Anterior Ankle Dorsiflexion   5  5   L5/S1 Extensor Hallicus Longus Great toe extension  5  5   S1/S2 Gastroc/Soleus Plantar Flexion  5  5       Labs:  No results found for: "LABA1C", "HGBA1C"    Lab Results   Component Value Date    WBC 10.67 07/23/2024    HGB 13.2 07/23/2024    HCT 40.6 07/23/2024    MCV 92 07/23/2024     07/23/2024         Imaging:  Xray lumbar spine 8/16/24  FINDINGS:  There is a 25 degree levoscoliosis in the lower thoracic and lumbar spine.  The apex is on the left at L1.  There is reversal of normal lordosis.  There is grade 1 anterolisthesis of L3 on L4.  Loss of disc height is seen at L4.  The patient is osteopenic.  Vertebral body heights, alignment and density are otherwise within normal limits. There is advanced loss of disc height, sclerotic discogenic endplate changes and facet arthropathy throughout the lumbar spine.       Arterial calcifications are noted.    MRI lumbar spine 9/4/24  FINDINGS  There is a chronic severe central L4 fracture deformity with 6 mm depth dorsal annular cortical deformity of the posterior superior vertebral body wall cortex.  Allowing for difference in imaging modality, this is similar to the prior CT scan.  Age and etiology should be correlated clinically.    There is no evidence of acute lumbar compression fracture.  Chronic moderate background levoconvex spinal curvature deformity is present.    Hemangiomas are present, largest at T12.    There is no conus medullaris mass.  The spinal cord terminates at the L1 level.    Axial images were completed demonstrating the following:  T12-L1:  A posterior midline annular fissure is noted.  The spinal canal is patent.  There is no herniation.  The disc is partially desiccated  L1-L2:  Severe disc space " narrowing is evident right of midline laterally.  Endplate spondylosis with mild contour bulging of the disc noted.  Combined with facet hypertrophy, there is moderate right foraminal stenosis.  L2-L3:  A generalized, up to 2 mm depth disc bulge is noted with midline cranial migration.  There is no canal stenosis.  Mild ligamentum flavum thickening, facet hypertrophy and effusions are noted.  The neural foramen are patent.  L3-L4:  Left laminectomy defect noted.  Retropulsed bone related to the L4 fracture deformity causes severe flattening deformity of the thecal sac contour most severe left of midline.  There is crowding of the nerve root centrally within the thecal sac.  Retropulsed bone combined with spondylosis, peripheral bulging of the disc and facet hypertrophy produces severe left greater than right foraminal stenosis.  Small right facet joint effusion is noted.  L4-L5:  The spinal canal is patent.  The spinal canal is patent.  There is an asymmetric right proximal foraminal 3.2-mm disc herniation (series 5, image 3, series 9, image 23).  The herniated disc abuts and slightly displaces the exiting L4 nerve root.  Ligamentum flavum thickening is evident with mild canal stenosis.  Facet hypertrophy moderately narrows the left foramen.  L5-S1:  Bilateral facet arthrosis is noted.  There is 3 mm anterior offset of L5 with mild uncovering of the disc noted.  Large left lateral annular fissure noted with bilateral facet hypertrophy.  The left foramen is mildly narrowed.  A small right facet joint effusion is present.       Diagnosis:  1. Spinal stenosis of lumbar region with neurogenic claudication    2. Chronic midline low back pain with right-sided sciatica  -     Ambulatory referral/consult to Pain Clinic    3. Lumbar radiculopathy    4. Failed back surgical syndrome          Mendy Perdomo is a 89 y.o. female patient who has a past medical history of Diverticulitis, Greater trochanter fracture, Hepatitis,  Left elbow fracture, Restless leg syndrome, Stroke, and Wrist fracture, bilateral. She presents with back pain.  She is here for a 2nd opinion.  She reports chronic lower back pain for over the past year.  She has a history of previous lumbar compression fracture in July of 2024.  Today she reports her pain is usually 5-6/10, intermittent, aching in her lower back radiating down both of her hips and both of her thighs.  Pain is worse with standing and walking and relieved with heat, medications, PT.    - on exam she has full strength in his lower extremities.  Intact sensation to light touch bilateral L2-S1.  Pain with lumbar axial facet loading  - I independently reviewed her lumbar MRI and she has evidence of previous compression fracture at L4 with near complete height loss.  There is 6 mm retropulsion of the fracture that causes severe central canal narrowing up closer to the L3-4 level.  She has significant multilevel bilateral facet arthropathy.  She has history of laminectomy at L3-4 on the left  - think the patient's pain is primarily coming from her severe central canal narrowing from her retropulsed compression fracture although she likely has facet mediated axial back pain as well  - she sees an outside pain management physician and has diagnostic lumbar medial branch blocks scheduled later this month.  - over the past 6 months she has completed formal physical therapy and does her best to maintain PT directed home exercise program.  She uses Celebrex 100 mg before times a day, tizanidine 4 mg up to 4 times a day, gabapentin 600 mg 4 times a day and oxycodone 10/325 mg on an as needed basis.  She reports she only uses the oxycodone if she is planning to leave the house.  She says this is once a day or less  - I discussed my recommendation would be to try a lumbar epidural injection to see if we can help improve her pain then I think is related to her severe central canal narrowing.  She likely also has an  axial component of her back pain and diagnostic medial branch blocks are reasonable to address as well.  At this time she is going to try the diagnostic medial branch blocks with her outside pain physician.  She will let me know if she would like me to take over her care.  We discussed my goal would be to help improve her pain and help decrease the amount of medications that she is relying on.  I think given her lumbar narrowing, advanced age which makes her a poor surgical candidate, and history of prior laminectomy, if she did not get relief with the epidural injection or medial branch blocks she would be a good candidate for spinal cord stimulator trial      : Reviewed and consistent with medication use as prescribed.    Bola Morales M.D.  Interventional Pain Medicine / Anesthesiology    This note was completed with dictation software and grammatical errors may exist.